# Patient Record
Sex: FEMALE | Race: WHITE
[De-identification: names, ages, dates, MRNs, and addresses within clinical notes are randomized per-mention and may not be internally consistent; named-entity substitution may affect disease eponyms.]

---

## 2018-01-17 ENCOUNTER — HOSPITAL ENCOUNTER (EMERGENCY)
Dept: HOSPITAL 44 - ED | Age: 73
Discharge: HOME | End: 2018-01-17
Payer: MEDICARE

## 2018-01-17 VITALS — DIASTOLIC BLOOD PRESSURE: 86 MMHG | SYSTOLIC BLOOD PRESSURE: 161 MMHG

## 2018-01-17 DIAGNOSIS — J02.9: Primary | ICD-10-CM

## 2018-01-17 DIAGNOSIS — F41.9: ICD-10-CM

## 2018-01-17 DIAGNOSIS — I10: ICD-10-CM

## 2018-01-17 LAB
BASOPHILS NFR BLD: 0.6 % (ref 0–1.5)
EGFR (AFRICAN): > 60
EGFR (NON-AFRICAN): > 60
EOSINOPHIL NFR BLD: 2.8 % (ref 0–6.8)
MCH RBC QN AUTO: 31.7 PG (ref 28–34)
MCV RBC AUTO: 91.2 FL (ref 80–100)
MONOCYTES %: 4.2 % (ref 0–11)
NEUTROPHILS #: 5.9 # K/UL (ref 1.4–7.7)

## 2018-01-17 PROCEDURE — 96360 HYDRATION IV INFUSION INIT: CPT

## 2018-01-17 PROCEDURE — 99283 EMERGENCY DEPT VISIT LOW MDM: CPT

## 2018-01-17 PROCEDURE — 80053 COMPREHEN METABOLIC PANEL: CPT

## 2018-01-17 PROCEDURE — 71020: CPT

## 2018-01-17 PROCEDURE — 85025 COMPLETE CBC W/AUTO DIFF WBC: CPT

## 2018-01-17 PROCEDURE — S1016 NON-PVC INTRAVENOUS ADMINIST: HCPCS

## 2018-01-17 PROCEDURE — 87400 INFLUENZA A/B EACH AG IA: CPT

## 2018-01-17 PROCEDURE — 84484 ASSAY OF TROPONIN QUANT: CPT

## 2018-01-17 NOTE — ED PHYSICIAN DOCUMENTATION
Upper Respiratory Symptoms





- HISTORIAN


Historian: patient





- HPI


Stated Complaint: SOA


Chief Complaint: Cough/ Upper Respiratory


Onset: hours


Severity: mild


Associated Symptoms: sore throat, hoarseness.  denies: fever, chills, sweating, 

earache, runny nose, sinus pain, sinus drainage, hay fever, shortness of breath


Worsened by Deep Breath: No


Further Comments: yes (72 year old female patient presents with complaints of 

being "hoarse" and anxious.  Patient reports symptoms started around [id number], 

did not use any OTC medication PTA.)





- ROS


CONST/EYES: denies: weakness


CVS/RESP: none


LYMPH: denies: leg swelling, rash, swollen glands, ankle swelling, other


GI/: none


NEURO/PSYCH: denies: fainting, dizziness, confusion, anxiety, depression, other


MS/SKIN: denies: joint pain, muscle aches, rash, other





- PAST HX


Lung Disease: none


PE Risk Factors: hypertension


Other History: other (chronic sinusitis)


Allergies/Adverse Reactions: 


 Allergies











Allergy/AdvReac Type Severity Reaction Status Date / Time


 


albuterol AdvReac  Tremors Verified 01/17/18 10:46














Home Medications: 


 Ambulatory Orders











 Medication  Instructions  Recorded


 


Fexofenadine HCl 180 mg PO DAILY #30 u2 03/18/13


 


Triamcinolone Acetonide [Nasacort] 10.8 ml NS DAILY 01/12/15


 


amLODIPine BESYLATE [Norvasc] 5 mg PO DAILY #30 tablet 01/17/18














- SOCIAL HX


Smoking History: non-smoker





- FAMILY HX


Family History: denies: none





- VITAL SIGNS


Vital Signs: 


 Vital Signs











Temp Pulse Resp BP Pulse Ox


 


 97.4 F L  84   19   161/86   98 


 


 01/17/18 09:53  01/17/18 12:20  01/17/18 12:20  01/17/18 12:20  01/17/18 12:20














- REVIEWED ASSESSMENTS


Nursing Assessment  Reviewed: Yes


Vitals Reviewed: Yes





Progress





- Progress


Progress: 





Discussed hypertension.  Patient reports she was on lisinopril which caused her 

to cough.  Dr Owens changed to losartan, but she stopped that over 1 year 

ago.  " I didn't like it."  Patient has not followed up with PCP regarding BP. 





Case discussed with Dr Owens.  Will start patient on norvasc 5mg po qd.  





Hypertension retreated with clonidine while in ER.  Improved with 1 dose. 





Education on hypertension, strongly encouraged follow up. Reviewed lab results; 

questions answered. 





ED Results Lab/Radiology





- Lab Results


Lab Results: 


 Lab Results











  01/17/18 01/17/18 01/17/18





  11:00 10:29 10:25


 


WBC      





    


 


RBC      





    


 


Hgb      





    


 


Hct      





    


 


MCV      





    


 


MCH      





    


 


MCHC      





    


 


RDW      





    


 


Plt Count      





    


 


Neut % (Auto)      





    


 


Lymph % (Auto)      





    


 


Mono % (Auto)      





    


 


Eos % (Auto)      





    


 


Baso % (Auto)      





    


 


Neut # (Auto)      





    


 


Lymph # (Auto)      





    


 


Mono # (Auto)      





    


 


Eos # (Auto)      





    


 


Baso # (Auto)      





    


 


Reactive Lymphs %      





    


 


Reactive Lymphs #      





    


 


Sodium      142 mmol/L mmol/L





     (136-145) 


 


Potassium      3.7 mmol/L mmol/L





     (3.5-5.1) 


 


Chloride      105 mmol/L mmol/L





     () 


 


Carbon Dioxide      25 mmol/L mmol/L





     (22-30) 


 


BUN      18 mg/dL H mg/dL





     (7-17) 


 


Creatinine      0.70 mg/dL mg/dL





     (0.52-1.04) 


 


Estimated Creat Clear      105 





    


 


Est GFR ( Amer)      > 60 





    (60 - )


 


Est GFR (Non-Af Amer)      > 60 





    (60 - )


 


Glucose      106 mg/dL mg/dL





     () 


 


Calcium      9.9 mg/dL mg/dL





     (8.4-10.2) 


 


Total Bilirubin      0.4 mg/dL mg/dL





     (0.2-1.3) 


 


AST      25 U/L U/L





     (15-46) 


 


ALT      38 U/L U/L





     (13-69) 


 


Alkaline Phosphatase      69 U/L U/L





     () 


 


Troponin I  < 0.03 ng/mL L ng/mL    





   (0.03-0.06)   


 


Total Protein      7.3 g/dL g/dL





     (6.3-8.2) 


 


Albumin      4.2 g/dL g/dL





     (3.5-5.0) 


 


Influenza A (Rapid)    Negative   





    (NEGATIVE)  


 


Influenza B (Rapid)    Negative   





    (NEGATIVE)  














  01/17/18





  10:25


 


WBC  8.80 K/ul K/ul





   (4.00-12.00) 


 


RBC  4.77 M/ul M/ul





   (3.90-5.20) 


 


Hgb  15.1 g/dL g/dL





   (12.0-16.0) 


 


Hct  43.5 % %





   (34.5-46.5) 


 


MCV  91.2 fl fl





   (80.0-100.0) 


 


MCH  31.7 pg pg





   (28.0-34.0) 


 


MCHC  34.8 g/dL g/dL





   (30.0-36.0) 


 


RDW  13.5 % %





   (11.3-14.3) 


 


Plt Count  218 K/mm3 K/mm3





   (130-400) 


 


Neut % (Auto)  67.9 % %





   (39.0-79.0) 


 


Lymph % (Auto)  23.0 % %





   (16.0-50.0) 


 


Mono % (Auto)  4.2 % %





   (0.0-11.0) 


 


Eos % (Auto)  2.8 % %





   (0.0-6.8) 


 


Baso % (Auto)  0.6 





   (0.0-1.5) 


 


Neut # (Auto)  5.9 # k/uL # k/uL





   (1.4-7.7) 


 


Lymph # (Auto)  2.0 # k/uL # k/uL





   (0.6-4.0) 


 


Mono # (Auto)  0.4 # k/uL # k/uL





   (0.0-0.9) 


 


Eos # (Auto)  0.2 # k/uL # k/uL





   (0.0-0.6) 


 


Baso # (Auto)  0.0 # k/uL # k/uL





   (0.0-0.5) 


 


Reactive Lymphs %  1.5 % %





   (0.0-5.0) 


 


Reactive Lymphs #  0.1 # k/uL # k/uL





   (0.0-0.8) 


 


Sodium  





  


 


Potassium  





  


 


Chloride  





  


 


Carbon Dioxide  





  


 


BUN  





  


 


Creatinine  





  


 


Estimated Creat Clear  





  


 


Est GFR ( Amer)  





  


 


Est GFR (Non-Af Amer)  





  


 


Glucose  





  


 


Calcium  





  


 


Total Bilirubin  





  


 


AST  





  


 


ALT  





  


 


Alkaline Phosphatase  





  


 


Troponin I  





  


 


Total Protein  





  


 


Albumin  





  


 


Influenza A (Rapid)  





  


 


Influenza B (Rapid)  





  














- Orders


Orders: 


 ED Orders











 Category Date Time Status


 


 Continuous EKG monitoring Q30M Care  01/17/18 10:01 Active


 


 Continuous Pulse Oximetry Q30M Care  01/17/18 10:01 Active


 


 Place IV Lock 1T Care  01/17/18 10:01 Active


 


 CHEST 2 VIEW [CHEST P.A.&LAT 2 VIEWS] [RAD] Stat Exams  01/17/18 Completed


 


 CBC/PLATELET/DIFF Stat Lab  01/17/18 10:25 Completed


 


 CMP Stat Lab  01/17/18 10:25 Completed


 


 INFLUENZA A&B Stat Lab  01/17/18 10:29 Completed


 


 TROPONIN I (cTnI) Stat Lab  01/17/18 11:00 Completed


 


 0.9 % Sodium Chloride [Normal Saline] 1,000 ml Med  01/17/18 11:00 Discontinued





 IV NOW   


 


 CloNIDine HCL [Catapress] Med  01/17/18 11:38 Discontinued





 0.1 mg PO .STK-MED ONE   


 


 CloNIDine HCL [Catapress] Med  01/17/18 11:37 Discontinued





 0.1 mg PO NOW ONE   


 


 EKG WITH COMPARISON Stat Ther  01/17/18 10:01 Ordered














Upper Respiratory Symptoms





- EXAM


General Appearance: no acute distress, alert


EENT: eyes nml inspection, nml ENT inspection, lids & conjunct. nml, PERRL, ear 

nml, nose nml, pharynx nml, airway nml, other (mildly hoareness noted. )


Respiratory: no resp. distress, breath sounds nml, no pain on inspiration, 

speaks full sentences, no pleuritic chest pain


Abdomen: non-tender, no organomegaly, nml bowel sounds, no distention


CVS: reg rate & rhythm, heart sounds normal, equal pulses, no murmur, no gallop

, PMI nml, no JVD, no friction rub, 24


Skin: color nml, no rash, warm,dry


Extremities: non-tender, normal range of motion, no evidence of injury, no edema

, J, NP


Neuro/Psych: oriented x3, neuro intact, mood/affect nml, CN's nml as tested





Discharge


Clincal Impression: 


 Laryngitis, Anxiety





Hypertension


Qualifiers:


 Hypertension type: essential hypertension Qualified Code(s): I10 - Essential (

primary) hypertension





Prescriptions: 


amLODIPine BESYLATE [Norvasc] 5 mg PO DAILY #30 tablet


Additional Instructions: 


 your prescription and start it in the morning. 





Chloraseptic spray or lozenges as needed for throat pain.  


Warm salt water gargles as needed pain 


Increase your fluid intake  juices, hot tea, non-caffeinated beverages





Follow up with Dr Owens next week for a BP check and to discuss anxiety 

management. 


Condition: Stable


Disposition: 01 HOME, SELF-CARE


Decision to Admit: NO


Decision Time: 12:10

## 2018-01-17 NOTE — DIAGNOSTIC IMAGING REPORT
TY ROLON (SAYDA) - ER 

[facility]

[address] [facility] [address] [address]

 

 

 

 

Report Submission Date: 2018 10:54:41 AM CST

Patient       Study

Name:   MESHA INGRAM       Date:   2018 10:43:19 AM CST

MRN:          Modality Type:   CR

Gender:   F       Description:   CHEST

:   47       Institution:   [facility]

Physician:   TY ROLON) - ER

     Accession:    [id number]

 

 

Examination: Portable chest 



History: Evaluate lungs 



Comparison exam: None provided 



Findings: Single view of the chest demonstrates a normal cardiac and 
mediastinal silhouette. Chronic appearing interstitial changes. Lung fields 
without focal infiltrate. No blunting of the costophrenic margins. Left midlung 
granuloma.   Osseous structures are appropriate for age. 



Impression: No acute appearing pulmonary process.

 

Electronically signed on 2018 10:54:41 AM CST by:

Liban BROOKS

## 2018-04-27 ENCOUNTER — HOSPITAL ENCOUNTER (EMERGENCY)
Dept: HOSPITAL 44 - ED | Age: 73
Discharge: HOME | End: 2018-04-27
Payer: MEDICARE

## 2018-04-27 VITALS — SYSTOLIC BLOOD PRESSURE: 139 MMHG | DIASTOLIC BLOOD PRESSURE: 80 MMHG

## 2018-04-27 DIAGNOSIS — J45.998: ICD-10-CM

## 2018-04-27 DIAGNOSIS — J44.1: Primary | ICD-10-CM

## 2018-04-27 PROCEDURE — 71046 X-RAY EXAM CHEST 2 VIEWS: CPT

## 2018-04-27 PROCEDURE — 96372 THER/PROPH/DIAG INJ SC/IM: CPT

## 2018-04-27 PROCEDURE — 99284 EMERGENCY DEPT VISIT MOD MDM: CPT

## 2018-04-27 PROCEDURE — 94640 AIRWAY INHALATION TREATMENT: CPT

## 2018-04-27 NOTE — DIAGNOSTIC IMAGING REPORT
MAGNUS HEDRICK 

[facility]

[address] [facility] [address] [address]

 

 

 

 

Report Submission Date: 2018 7:37:20 PM CDT

Patient       Study

Name:   MESHA INGRAM       Date:   2018 6:47:38 PM CDT

MRN:   [id number]       Modality Type:   DX

Gender:   F       Description:   CHEST

:   47       Institution:   [facility]

Physician:   MAGNUS HEDRICK

     Accession:    [id number]

 

 

HISTORY:   72-year-old female with wheezing, swelling and ankles, history of 
asthma. 



COMPARISON: None available. 



TECHNIQUE: 2 views of the chest were performed. 



FINDINGS: There are calcified granulomas in the left lung.  No pneumothorax, 
consolidative infiltrates, pleural effusions, or pulmonary edema.  There may be 
calcified lymph nodes in the left hilum.  The heart is borderline enlarged.  
There is mild to moderate thoracic degenerative disc disease. 



IMPRESSION: 

Old granulomatous disease of the chest without evidence of acute intrathoracic 
process.

 

Electronically signed on 2018 7:37:20 PM CDT by:

Raymundo BROOKS

## 2018-04-27 NOTE — ED PHYSICIAN DOCUMENTATION
General Adult





- HISTORIAN


Historian: patient





- HPI


Stated Complaint: wheezing


Chief Complaint: General Adult


Additional Information: 





Three days of increased wheezing in known asthmatic. Ha snot needed to use her 

nebulizer in years, and it no longer works. Allergic to albuterol (heart rate 

to 220) but can use levalbuterol. Seen at walk in clinic yesterday and started 

prednisone and zithromax. No better today. Had temp of 100 yesterday. Two 

hospitalizations for asthma but never intubated. No other modifying factors or 

associated signs.. 





- ROS


CONST: fever (see above)





- PAST HX


Past History: asthma





- SOCIAL HX


Smoking History: non-smoker





- FAMILY HX


Family History: No (no significant)





- VITAL SIGNS


Vital Signs: 





 Vital Signs











Temp Pulse Resp BP Pulse Ox


 


          161/86    


 


          01/17/18 12:20   














- REVIEWED ASSESSMENTS


Nursing Assessment  Reviewed: Yes


Vitals Reviewed: Yes





<MAGNUS HEDRICK - Last Filed: 04/27/18 19:12>





- VITAL SIGNS


Vital Signs: 





 Vital Signs











Temp Pulse Resp BP Pulse Ox


 


 98.2 F   130 H  22   178/135   90 L


 


 04/27/18 18:27  04/27/18 18:27  04/27/18 18:27  04/27/18 18:27  04/27/18 18:27














<Shahid Araujo - Last Filed: 04/27/18 22:55>





- PAST HX


Allergies/Adverse Reactions: 


 Allergies











Allergy/AdvReac Type Severity Reaction Status Date / Time


 


albuterol AdvReac  Tremors Verified 01/17/18 10:46


 


iodine AdvReac  Anaphylaxis Verified 04/27/18 19:27


 


levofloxacin [From Levaquin] AdvReac  Anaphylaxis Verified 04/27/18 19:27


 


metronidazole AdvReac  Anaphylaxis Verified 04/27/18 19:27














Home Medications: 


 Ambulatory Orders











 Medication  Instructions  Recorded


 


Fexofenadine HCl 180 mg PO DAILY #30 u2 03/18/13


 


Triamcinolone Acetonide [Nasacort] 10.8 ml NS DAILY 01/12/15














Progress





- Progress


Progress: 





care to Dr. Araujo








<MAGNUS HEDRICK - Last Filed: 04/27/18 19:12>





ED Results Lab/Radiology





- Orders


Orders: 





 ED Orders











 Category Date Time Status


 


 CHEST 2VIEW [RAD] Stat Exams  04/27/18 Ordered


 


 Levalbuterol HCl [Xopenex] Med  04/27/18 18:41 Once





 1.25 mg NEB NOW ONE   














<MAGNUS HEDRICK - Last Filed: 04/27/18 19:12>





- Orders


Orders: 





 ED Orders











 Category Date Time Status


 


 CHEST 2VIEW [RAD] Stat Exams  04/27/18 Completed


 


 Acetylcysteine [Mucomyst] Med  04/27/18 20:34 Discontinued





 200 mg PO NOW ONE   


 


 Acetylcysteine [Mucomyst] Med  04/27/18 22:41 Discontinued





 200 mg PO NOW ONE   


 


 Levalbuterol HCl [Xopenex] Med  04/27/18 18:41 Discontinued





 1.25 mg NEB NOW ONE   


 


 Levalbuterol HCl [Xopenex] Med  04/27/18 19:42 Discontinued





 1.25 mg NEB NOW ONE   


 


 methylPREDNISolone SOD SUCC [Solu-MEDROL] Med  04/27/18 19:43 Discontinued





 125 mg IM NOW ONE   














<Shahid Araujo - Last Filed: 04/27/18 22:55>





General Adult Physical Exam





- PHYSICAL EXAM


GENERAL APPEARANCE: mild distress


EENT: eye inspection normal, ENT inspection normal, pharynx normal


NECK: supple


RESPIRATORY: no resp distress, wheezes (throughout)


CVS: reg rate & rhythm, heart sounds normal


BACK: other (thoracic kyphosis; fluid movements w/o pain)


SKIN: warm/dry, normal color


EXTREMITIES: no evidence of injury


NEURO: CN's nml as tested, motor nml, sensation nml, cognition normal





<MAGNUS HEDRICK - Last Filed: 04/27/18 19:12>





- PHYSICAL EXAM


ABDOMEN: soft, no organomegaly, no distension





<Shahid Araujo - Last Filed: 04/27/18 22:55>





Discharge





<MAGNUS HEDRICK - Last Filed: 04/27/18 19:12>


Comments: 





home use neb cpt-fu w/pcp as directed


Decision to Admit: NO


Decision Time: 22:55





<Shahid Araujo Last Filed: 04/27/18 22:55>


Clincal Impression: 


 acute exaberation copd asthma





Referrals: 


Анна Owens MD [Primary Care Provider] - 2 Days


Condition: Good


Disposition: 01 HOME, SELF-CARE

## 2018-04-29 ENCOUNTER — HOSPITAL ENCOUNTER (EMERGENCY)
Dept: HOSPITAL 44 - ED | Age: 73
Discharge: HOME | End: 2018-04-29
Payer: MEDICARE

## 2018-04-29 VITALS
DIASTOLIC BLOOD PRESSURE: 80 MMHG | DIASTOLIC BLOOD PRESSURE: 80 MMHG | SYSTOLIC BLOOD PRESSURE: 156 MMHG | SYSTOLIC BLOOD PRESSURE: 156 MMHG | DIASTOLIC BLOOD PRESSURE: 80 MMHG | DIASTOLIC BLOOD PRESSURE: 80 MMHG | SYSTOLIC BLOOD PRESSURE: 156 MMHG | SYSTOLIC BLOOD PRESSURE: 156 MMHG

## 2018-04-29 DIAGNOSIS — I10: ICD-10-CM

## 2018-04-29 DIAGNOSIS — J45.998: Primary | ICD-10-CM

## 2018-04-29 LAB
BASOPHILS NFR BLD: 0.2 % (ref 0–1.5)
EGFR (AFRICAN): > 60
EGFR (NON-AFRICAN): > 60
EOSINOPHIL NFR BLD: 0 % (ref 0–6.8)
MCH RBC QN AUTO: 30.7 PG (ref 28–34)
MCV RBC AUTO: 92.9 FL (ref 80–100)
MONOCYTES %: 4 % (ref 0–11)
NEUTROPHILS #: 11.9 # K/UL (ref 1.4–7.7)

## 2018-04-29 PROCEDURE — 84484 ASSAY OF TROPONIN QUANT: CPT

## 2018-04-29 PROCEDURE — 94640 AIRWAY INHALATION TREATMENT: CPT

## 2018-04-29 PROCEDURE — 83880 ASSAY OF NATRIURETIC PEPTIDE: CPT

## 2018-04-29 PROCEDURE — S1016 NON-PVC INTRAVENOUS ADMINIST: HCPCS

## 2018-04-29 PROCEDURE — 85730 THROMBOPLASTIN TIME PARTIAL: CPT

## 2018-04-29 PROCEDURE — 85025 COMPLETE CBC W/AUTO DIFF WBC: CPT

## 2018-04-29 PROCEDURE — 99283 EMERGENCY DEPT VISIT LOW MDM: CPT

## 2018-04-29 PROCEDURE — 85610 PROTHROMBIN TIME: CPT

## 2018-04-29 PROCEDURE — 82553 CREATINE MB FRACTION: CPT

## 2018-04-29 PROCEDURE — 96365 THER/PROPH/DIAG IV INF INIT: CPT

## 2018-04-29 PROCEDURE — 93005 ELECTROCARDIOGRAM TRACING: CPT

## 2018-04-29 PROCEDURE — 80053 COMPREHEN METABOLIC PANEL: CPT

## 2018-04-29 PROCEDURE — 82550 ASSAY OF CK (CPK): CPT

## 2018-04-29 PROCEDURE — 71045 X-RAY EXAM CHEST 1 VIEW: CPT

## 2018-04-29 PROCEDURE — 85379 FIBRIN DEGRADATION QUANT: CPT

## 2018-04-29 PROCEDURE — 83690 ASSAY OF LIPASE: CPT

## 2018-04-29 NOTE — ED PHYSICIAN DOCUMENTATION
General Adult





- HISTORIAN


Historian: patient, spouse





- HPI


Stated Complaint: sob


Chief Complaint: General Adult


Onset: days ago


Timing: still present


Severity: moderate


Further Comments: yes (Pt is a 71 yo female with sob/wheezing and slight pain, 1

/10 severity, in her R upper back.  Pt has hx asthma with 2 hospitalizations, 

but these occurred years ago and pt has not had asthma attacks until this past 

week.  Pt was seen in clinic last week and rx'd steroids and zithromax.  She 

came to ER on 2 days ago and was tx'd with xopenex and mucomyst.  Pt has had 

adverse reactions to albuterol and reported that mucomyst had been given to her 

in the past to good effect.  Pt came to ER because her home nebulizer is old 

and no longer works.  She was given a rx for a new nebulizer 2 days ago, but 

has not got the nebulizer yet.)





- ROS


CONST: no problems


EYES/ENT: none


CVS/RESP: chest pain (slight upper back pain), shortness of breath


GI/: none


MS/SKIN/LYMPH: none





- PAST HX


Past History: asthma, hypertension, other (anxiety)


Surgeries/Procedures: other (oral surgery)


Allergies/Adverse Reactions: 


 Allergies











Allergy/AdvReac Type Severity Reaction Status Date / Time


 


albuterol AdvReac  Tremors Verified 04/29/18 08:05


 


iodine AdvReac  Anaphylaxis Verified 04/29/18 08:05


 


levofloxacin [From Levaquin] AdvReac  Anaphylaxis Verified 04/29/18 08:05


 


metronidazole AdvReac  Anaphylaxis Verified 04/29/18 08:05














Home Medications: 


 Ambulatory Orders











 Medication  Instructions  Recorded


 


Fexofenadine HCl 180 mg PO DAILY #30 u2 03/18/13


 


Triamcinolone Acetonide [Nasacort] 10.8 ml NS DAILY 01/12/15














- SOCIAL HX


Smoking History: non-smoker





- FAMILY HX


Family History: No





- VITAL SIGNS


Vital Signs: 





 Vital Signs











Temp Pulse Resp BP Pulse Ox


 


          139/80    


 


          04/27/18 23:29   














- REVIEWED ASSESSMENTS


Nursing Assessment  Reviewed: Yes


Vitals Reviewed: Yes





Progress





- Progress


Progress: 





CXR: 1. Possible minimal basilar atelectasis.. No focal consolidation, pleural 

effusion or pneumothorax.


 





Pulmicort HFN





Xopenex HFN





Mucomyst 20% solution 3 ml HFN x 1





improved.








Rx Xopenex (1.25mg/3ml).  One HFN tx tid prn.  24 vials, 2 RF





- EKG/XRAY/CT


EKG: NSR (HR=98; normal axis; normal WA interval.)





General Adult Physical Exam





- PHYSICAL EXAM


GENERAL APPEARANCE: mild distress


EENT: pharynx normal


NECK: normal inspection, supple


RESPIRATORY: chest non-tender, wheezes


CVS: reg rate & rhythm, heart sounds normal, no murmur


ABDOMEN: soft, no organomegaly, normal bowel sounds


BACK: normal inspection, no CVA tenderness


SKIN: warm/dry, normal color


EXTREMITIES: non-tender, normal range of motion, no evidence of injury, no edema


NEURO: oriented X3, motor nml, sensation nml





Discharge


Clincal Impression: 


 wheezing





Referrals: 


Анна Owens MD [Primary Care Provider] - 


Condition: Stable


Disposition: 01 HOME, SELF-CARE


Decision to Admit: NO


Decision Time: 11:33

## 2018-04-29 NOTE — DIAGNOSTIC IMAGING REPORT
[facility]

[address] [facility] [address]

 

 

 

 

Report Submission Date: 2018 8:51:17 AM CDT

Patient       Study

Name:   MESHA INGRAM       Date:   2018 8:04:50 AM CDT

MRN:   [id number]       Modality Type:   DX

Gender:   F       Description:   CHEST

:   47       Institution:   [facility]

Physician:   RAJ CERNA

     Accession:    [id number]

 

 

Single frontal view of the chest 

History: PT STATES CHEST PAIN,SOB,PRODUCTIVE COUGH X4 DAYS 

Comparison: 2018 



Cardiac size is upper limits of normal. 7 mm left midlung calcified granuloma 
is again noted. 

Minimal basilar haziness.. No pleural effusion or pneumothorax. No acute 
osseous pathology. 



Impression: 

1. Possible minimal basilar atelectasis.. No focal consolidation, pleural 
effusion or pneumothorax.

 

Electronically signed on 2018 8:51:17 AM CDT by:

Rachna BROOKS

## 2018-05-11 ENCOUNTER — HOSPITAL ENCOUNTER (OUTPATIENT)
Dept: HOSPITAL 44 - PULMONARY | Age: 73
End: 2018-05-11
Attending: INTERNAL MEDICINE
Payer: MEDICARE

## 2018-05-11 DIAGNOSIS — J45.909: Primary | ICD-10-CM

## 2018-05-11 DIAGNOSIS — G47.33: ICD-10-CM

## 2018-05-11 DIAGNOSIS — R09.81: ICD-10-CM

## 2018-05-11 DIAGNOSIS — I10: ICD-10-CM

## 2018-05-11 DIAGNOSIS — J30.9: ICD-10-CM

## 2018-05-11 PROCEDURE — 99214 OFFICE O/P EST MOD 30 MIN: CPT

## 2018-05-25 NOTE — CONSULTATION REPORT
PRIMARY CARE PROVIDER:

Dr. Анна Owens



CONSULTING PHYSICIAN: 

Sarah Bowen MD



CHIEF COMPLAINT:

"I would like some help managing my asthma."



SIGNIFICANT PROBLEM LIST:

1.   Asthma diagnosed in 1977.

2.   Hypertension.

3.   Obstructive sleep apnea (ROASLINA).

4.   History of pneumonia. 

5.   History of DVT.

6.   "White count hypertension."

7.   Multiple environmental allergies. 



HISTORY OF PRESENT ILLNESS: 

This is a 72-year-old female who has had a long history of asthma and would 
like some input with her medications and how to control her asthma.  



Overall, her asthma is usually very well controlled, she is usually off of 
prednisone.  She is unable to take albuterol or salmeterol but can take 
levalbuterol (Xopenex).   



Patient states she developed a viral URI on April 26, 2018.  She saw a nurse 
practitioner for cough and sinus congestion.  She was prescribed Benzonatate, a 
prednisone taper, and azithromycin.  Her shortness of breath worsened and she 
presented to [facility] Emergency Department on April 27.
   A chest x-ray was done and she required 3 nebulized doses of Xopenex, 
Mucomyst, and was given methylprednisolone 125 mg.  She was also given 
Pulmicort (budesonide).   On April 30, patient saw Dr. Owens who put her 
on a prednisone taper and azithromycin.   She currently states she is feeling 
better and she is finishing her prednisone taper.   



Patient brought all of her respiratory and sinus medicines with her.  It was 
all on the bedside table and we reviewed all the medicines together.   One 
thing that became apparent is that she was taking suboptimal doses of 
fluticasone and using theophylline on a p.r.n. basis.  



She states that she does have problems with sinus congestion and that her sinus 
problems can exacerbate her asthma.  Currently, she is using a steroid nasal 
spray but only on a p.r.n. basis.  She is also using Allegra and Sudafed.  



She states that these are her environmental allergies, pine, (both inhaled and 
contact), cedar,   cottonwood, oak pollen, smoke, mold, mildew, scented candles
, except lavender, ingested bell peppers, and Cool Whip.  



Patient denies chest pain.  There is no PND.  There is no edema.  Her weight is 
stable.  Appetite is good.   Energy level is good.  Currently, there is a cough
, nonproductive, and it is improving.  She denies fever, chills, or sweats.   
There has been no hemoptysis.  She has been on prednisone and antibiotics for 
her lung disease. 



Tobacco use:   Life-long nonsmoker.



REVIEW OF SYSTEMS:

Please see HPI for pertinent review of systems.  Also, please see [facility] patient intake record.  



ALLERGIES:

1.    Metronidazole.

2.    Albuterol.

3.    Iodine. 

4.    Levofloxacin.



MEDICATIONS: 

1.   Xopenex 1 puff b.i.d. and p.r.n.

2.   Flovent 220 mcg 1 puff daily and p.r.n.

3.   Theophylline 300 mg p.r.n. shortness of breath.

4.   Zafirlukast 20 mg b.i.d.

5.   Allegra 180 mg daily.

6.   Guaifenesin p.r.n. for cough.

7.   Sudafed p.r.n. for sinus drainage.

8.   Triamcinolone nasal spray 1 spray p.r.n.

9.   Prednisone taper that will finished within several days.

10. Amlodipine 5 mg p.r.n. prior to doctor visits.

11. Umeclidinium 1 inhalation daily given as a sample.

12. Multiple supplements including Gelatin, Beet root, D-mannose,  Qunol. 



SOCIAL HISTORY: 

She lives with her .  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 
health and the medications that she is taking.  



FAMILY HISTORY: 

Father with heart disease.   



PHYSICAL EXAMINATION: 

VITAL SIGNS:   BP:  168/120 (Please note, blood pressure re-taken after the 
appointment was 147/98), P: 112, R: 20, T: 96.8.  Room air oxygen saturation 
was 95%.  Height:  5 feet 2 inches.  Weight:  165 pounds.  BMI of 30.2.  Class 
1 obesity.

GENERAL:   This is a well-developed, well-nourished female in no acute distress 
speaking in full sentences.  

HEENT:  Pupils are equal and reactive to light.  Oropharynx is clear.  No 
thrush.  No erythema.

NECK:  Supple.  No JVD.  No lymphadenopathy.  No thyromegaly.

LUNGS:   Good bilateral air excursion.   No wheezes, crackles, or rhonchi.

CARDIAC:   Rate and rhythm are regular.  S1 and S2, without S3 or S4 or murmur.
   No gallops or rubs.

ABDOMEN:   Obese.  Soft.  Positive bowel sounds.  Nontender.    No organomegaly.

EXTREMITIES:   No cyanosis, clubbing, or edema.  

NEUROLOGIC:   Alert and oriented x3.   Grossly nonfocal exam.



IMAGING: 

Imaging independently reviewed by me personally.



1.    Chest x-ray on April 29, 2018.   Portable film.  Left upper lobe 
granuloma.  Bi-basilar atelectasis.  

2.    Chest x-ray on April 27, 2018.   Left upper lobe granuloma.  
Peribronchial cuffing.  No infiltrate. 



LABORATORIES:

1.    Chemistry done on April 29, 2018.   Sodium 144, potassium 4, chloride 104
, bicarbonate 23, BUN 21, creatinine 0.7, glucose 176. 

2.    CBC done on April 29, 2018.   Hemoglobin 16, hematocrit 48, white blood 
cell count 14.4, platelets 255,000.  



ASSESSMENT AND PLAN:

PROBLEM  #1.    Asthma. 

With the patient, we have reviewed all of her medicines and how she is taking 
them.  



PLAN:

1.   Increase the Flovent 220 mcg to 2 puffs b.i.d. regularly.  This is not a 
p.r.n. medication. 

2.   Addition of tiotropium 18 mcg capsule, 1 inhalation daily.

3.   I have reviewed the medical letter treatment of asthma with the patient 
and I have given her a copy of this.  



PROBLEM  #2:    Sinus congestion.  



PLAN: 

1.   Two sprays of nasal triamcinolone daily.   Again, this is not a p.r.n. 
medication.  

2.   Patient to return in 6 weeks.



PROBLEM #3:   Obstructive sleep apnea.

Due to time constraints, we did not discuss this issue.  



PLAN: 

Discuss at next visit. 





cc:   Dr. Анна BROOKS

## 2018-07-27 ENCOUNTER — HOSPITAL ENCOUNTER (OUTPATIENT)
Dept: HOSPITAL 44 - PULMONARY | Age: 73
End: 2018-07-27
Attending: INTERNAL MEDICINE
Payer: MEDICARE

## 2018-07-27 DIAGNOSIS — J45.909: Primary | ICD-10-CM

## 2018-07-27 DIAGNOSIS — R09.81: ICD-10-CM

## 2018-07-27 DIAGNOSIS — G47.33: ICD-10-CM

## 2018-07-27 PROCEDURE — 99214 OFFICE O/P EST MOD 30 MIN: CPT

## 2018-08-03 NOTE — OP CLINIC PROGRESS NOTE
PRIMARY CARE PROVIDER:

Dr. Анна Owens



CHIEF COMPLAINT:

"My asthma is better."



SIGNIFICANT PROBLEM LIST:

1.   Asthma diagnosed in 1977.

2.   Hypertension. 

3.   Obstructive sleep apnea. 

4.   History of pneumonia. 

5.   History of deep venous thrombosis (DVT).

6.   "White coat hypertension."

7.   Multiple environmental allergies



HISTORY OF PRESENT ILLNESS: 

This is a 72-year-old female who returns to the Pulmonary Clinic to assess her 
asthma.  She states that she feels great.  She has taken the previous 
recommendation and has increased her Flovent to 2 puffs b.i.d. and she has 
increased her nasal triamcinolone to 2 sprays each nostril daily.  She states 
that she has not had to use any Xopenex p.r.n. and is no longer using 
theophylline.   



I had given her a prescription for tiotropium at our last visit.  Her pharmacy 
was unable to fill that due to her insurance and she was given Incruse Ellipta (
umeclidinium) instead.  She states she used it for a short while but, overall, 
she felt that she did not need it.   She states that she will keep it and use 
it if she gets more short of breath. 



ALLERGIES:

1.   Metronidazole

2.   Albuterol.

3.   Iodine. 

4.   Levofloxacin. 



MEDICATIONS: 

1.   Xopenex 1 puff p.r.n. 

2.   Flovent 220 mcg 2 puffs b.i.d.

3.   Theophylline 300 mg p.r.n. shortness of breath.

4.   Zafirlukast 20 mg b.i.d.

5.   Allegra 180 mg daily. 

6.   Guaifenesin syrup p.r.n. 

7.   Sudafed p.r.n. for sinus drainage.

8.   Triamcinolone nasal spray 1 spray b.i.d.

9.   D-mannose 1 capsule daily. 

10. Qunol 1 capsule daily.

11. Multivitamin 1 tablet daily. 

12. Alaway eye drops.

13. Opcon eye drops.

14. Umeclidinium 1 inhalation p.r.n.

15. Gelatin 1300 mg daily. 

16. Beet root  1000 mg daily. 

17. Clonidine 0.1 mg prior to doctor's appointments p.r.n.



PHYSICAL EXAMINATION: 

VITAL SIGNS:  BP:  140/97, P: 90, R: 20, T: 98.5, room air oxygen saturation 
was 97%.  Weight:  164 pounds (Increase by 1 pound since 5-11-18). 

GENERAL:   This is a well-developed, well-nourished female in no acute distress 
speaking in full sentences.  

HEENT:  Pupils are equal and reactive to light.  Oropharynx is clear.  No 
thrush.  No erythema.

NECK:  Supple.  No lymphadenopathy.  

LUNGS:   Good bilateral air excursion.   Clear.  No wheezes, rhonchi or rales. 

CARDIAC:   Rate and rhythm are regular.  No murmur, rubs, or gallops.  

ABDOMEN:   Obese.  Soft and nontender.   

EXTREMITIES:   No edema, cyanosis, or clubbing.  

NEUROLOGIC:  Alert and oriented x3.   Grossly nonfocal physical exam.



IMAGING:

All imaging was independently reviewed by me personally.



1.   Chest x-ray on April 27, 2018.   Left granuloma.  Right lung atelectasis.  

2.   Chest x-ray on April 29, 2018.   Portable film.   Left upper lobe 
granuloma.



ASSESSMENT AND PLAN: 

PROBLEM  #1:   Asthma.

Patient's asthma currently under very good control.  She has incorporated my 
suggestions from our last visit and now using the appropriate doses of Flovent 
and nasal triamcinolone.  



PLAN: 

1.   Continue Flovent 220 mcg 2 puffs b.i.d.

2.   Use umeclidinium on a p.r.n. basis for significant asthma exacerbations (
please see Medical Letter for reference).

3.   Continue Xopenex p.r.n. as a rescue inhaler. 



PROBLEM  #2:   Sinus congestion.

Again, this is much improved since the patient started using triamcinolone at 
the proper dose and frequency.



PLAN: 

Continue nasal triamcinolone 2 sprays each nostril daily. 



PROBLEM  #3:   Obstructive sleep apnea. 

Again, due to time constraints, we did not discuss this issue.  



PLAN: 

1.   Does need to be discussed at subsequent visit. 

2.   Follow up in November 2018.





cc:   Dr. Анна BROOKS

## 2018-11-09 ENCOUNTER — HOSPITAL ENCOUNTER (OUTPATIENT)
Dept: HOSPITAL 44 - PULMONARY | Age: 73
End: 2018-11-09
Attending: INTERNAL MEDICINE
Payer: MEDICARE

## 2018-11-09 DIAGNOSIS — Z53.8: Primary | ICD-10-CM

## 2018-11-09 PROCEDURE — 99212 OFFICE O/P EST SF 10 MIN: CPT

## 2018-11-20 NOTE — OP CLINIC PROGRESS NOTE
PRIMARY CARE PROVIDER:

Dr. Анна Owens



CHIEF COMPLAINT:

"I had pneumonia in October."



SIGNIFICANT PROBLEM LIST:

1.   Asthma diagnosed in 1977.

2.   Hypertension. 

3.   Obstructive sleep apnea. 

4.   History of pneumonia.

5.   History of DVTs.

6.   "White coat hypertension."

7.   Multiple environmental allergies.



HISTORY OF PRESENT ILLNESS: 

This is a 73-year-old female who was in [place], [place], visiting her son.  She
developed a cough, fever, and sputum production.  She went to the emergency 
department and was told she had left lower lobe pneumonia.  She was treated with
ceftriaxone and azithromycin in the 

emergency department.  She was not admitted, and she was sent home with 
Augmentin and prednisone.  



She states she feels fine now excepts she notes she still has a hoarse voice.  
The hospital in [place] suggested she use a spacer which she is currently using.
 



She continues to use the fluticasone 220 mcg 2 puffs b.i.d.  She uses her 
umeclidinium on a p.r.n. basis.  She has stopped using theophylline and she also
is using her triamcinolone nasal spray. 



ALLERGIES:

1.   Metronidazole.

2.   Albuterol.

3.   Iodine.

4.   Levofloxacin. 



MEDICATIONS: 

1.   Xopenex 1 puff p.r.n. shortness of breath.

2.   Fluticasone 220 mcg 2 puffs b.i.d.

3.   Zafirlukast 20 mg b.i.d.

4.   Allegra 180 mg daily. 

5.   Guaifenesin p.r.n.

6.   Triamcinolone nasal spray 1 spray each nostril b.i.d.

7.   D-Mannose 1 capsule daily.

8.   Qunol 1 capsule daily. 

9.   MVI 1 tablet daily. 

10. Alaway  eye drops. 

11. Opcon eye drops. 

12. Umeclidinium 1 inhalation p.r.n. 

13. Gelatin 1300 mg daily.

14. Beet root 1000 mg daily. 

15. Clonidine 0.1 mg prior to doctor's appointments p.r.n. 

16. Theophylline 300 mg p.r.n. (Not taking at present). 



PHYSICAL EXAMINATION: 

GENERAL:   This is a well-developed obese female in no acute distress speaking 
in full sentences.  

VITAL SIGNS:  BP:  148/87, P: 87, R: 20, T: 96.7, oxygen saturation is 95% on 
room air.  Weight:  162 pounds.  Height:  5 feet 2 inches. 

HEENT:  Pupils are equal and reactive to light.  Oropharynx is clear.  No 
thrush.  No erythema.

NECK:  Supple.  No lymphadenopathy.  

LUNGS:   Good bilateral air excursion.   Clear to auscultation.  No wheezes, 
rales, or rhonchi.

CARDIAC:   Rate and rhythm are regular.  No murmur, rubs, or gallops.  

ABDOMEN:   Obese.  Soft and nontender.   

EXTREMITIES:   No cyanosis, clubbing, or edema. 

NEUROLOGIC:  Alert and oriented x3.   Grossly nonfocal exam.   



IMAGING:

Unable to review imaging because the PAC system does not work. 



ASSESSMENT:

PROBLEM  #1:   Asthma.

Currently doing well and is stable.  Of note, she did have pneumonia treated 
appropriately with Augmentin and prednisone in October 2018.  



PLAN: 

1.   Continue fluticasone 220 mcg 2 puffs b.i.d.

2.   Use umeclidinium on a p.r.n. basis for significant asthma exacerbation 
(Please see medical letter for reference).

3.   Patient has asked me to rewrite her Xopenex prescription using the word 
levalbuterol, which is the generic for Xopenex, due to insurance procedure.  
Prescription written for levalbuterol 2 puffs every 2 hours p.r.n. shortness of 
breath or wheezing.



PROBLEM  #2:   Sinus congestion. 

Patient has been using her triamcinolone nasal spray routinely and her sinuses 
are currently not congested.  



PLAN:  

1.    Continue nasal triamcinolone 2 sprays each nostril daily. 

2.    Ipratropium nasal spray 0.6% 2 sprays each nostril b.i.d. to t.i.d. p.r.n.
nasal discharge.



PROBLEM  #3:   My termination.

I have instructed the patient that this will be my last day at [facility] and I have referred her to Dr. Marco Gao if she would 
like to continue with a pulmonologist.  





cc:  Dr. Анна BROOKS

## 2019-01-07 ENCOUNTER — HOSPITAL ENCOUNTER (EMERGENCY)
Dept: HOSPITAL 44 - ED | Age: 74
Discharge: ANOTHER HOSPITAL | End: 2019-01-07
Payer: MEDICARE

## 2019-01-07 DIAGNOSIS — J18.9: ICD-10-CM

## 2019-01-07 DIAGNOSIS — R09.02: ICD-10-CM

## 2019-01-07 DIAGNOSIS — R06.03: ICD-10-CM

## 2019-01-07 DIAGNOSIS — J45.901: Primary | ICD-10-CM

## 2019-01-07 LAB
BASOPHILS NFR BLD: 0.7 % (ref 0–1.5)
EGFR (NON-AFRICAN): > 60
EOSINOPHIL NFR BLD: 1.4 % (ref 0–6.8)
MCH RBC QN AUTO: 31.4 PG (ref 28–34)
MCV RBC AUTO: 95 FL (ref 80–100)
MONOCYTES %: 5.8 % (ref 0–11)
NEUTROPHILS #: 15.8 # K/UL (ref 1.4–7.7)

## 2019-01-07 PROCEDURE — 96365 THER/PROPH/DIAG IV INF INIT: CPT

## 2019-01-07 PROCEDURE — 94640 AIRWAY INHALATION TREATMENT: CPT

## 2019-01-07 PROCEDURE — 96375 TX/PRO/DX INJ NEW DRUG ADDON: CPT

## 2019-01-07 PROCEDURE — 36415 COLL VENOUS BLD VENIPUNCTURE: CPT

## 2019-01-07 PROCEDURE — 87086 URINE CULTURE/COLONY COUNT: CPT

## 2019-01-07 PROCEDURE — 83605 ASSAY OF LACTIC ACID: CPT

## 2019-01-07 PROCEDURE — 87040 BLOOD CULTURE FOR BACTERIA: CPT

## 2019-01-07 PROCEDURE — 81002 URINALYSIS NONAUTO W/O SCOPE: CPT

## 2019-01-07 PROCEDURE — 93005 ELECTROCARDIOGRAM TRACING: CPT

## 2019-01-07 PROCEDURE — 99285 EMERGENCY DEPT VISIT HI MDM: CPT

## 2019-01-07 PROCEDURE — 96367 TX/PROPH/DG ADDL SEQ IV INF: CPT

## 2019-01-07 PROCEDURE — 85025 COMPLETE CBC W/AUTO DIFF WBC: CPT

## 2019-01-07 PROCEDURE — S1016 NON-PVC INTRAVENOUS ADMINIST: HCPCS

## 2019-01-07 PROCEDURE — 71045 X-RAY EXAM CHEST 1 VIEW: CPT

## 2019-01-07 PROCEDURE — 80053 COMPREHEN METABOLIC PANEL: CPT

## 2019-01-07 NOTE — ED PHYSICIAN DOCUMENTATION
Asthma





- HISTORIAN


Historian: patient





- HPI


Chief Complaint: Asthma


Additional Information: 





intro self as NP. pt presents to the ED via POV c/o wheezing/asthma hx that 

worsened at 5 pm today. pt reports URI symptoms since friday. pt has tried home 

nebulizers without improvement. pt emesis x 1 here. 





pt reports she is allergic to albuterol-increased heart rate 220s. not effective

per pt. 





pt denies current chest pain, syncope/near syncope, headache, dizziness, visual 

disturbances, diarrhea. fever/chills, rash, sick contacts, dysuria, trauma. 

melena or hematochezia, bleeding or easy bruising, change in bowel or bladder 

function, no recent weight loss/gain, anxiety or depression. ROS Negative unless

otherwise specified. 


Duration: continues in ED


Initiating Event: upper respiratory illness


Associated Symptoms:: shortness of breath


Current Asthma Therapy: inhaled nebulizer, other (incruse)





- ROS


CONST: recent illness


EYES/ENT: runny nose.  denies: sore throat


CVS: denies: heart racing, palpitations


GI/: denies: abdominal pain, problems urinating, vomiting, nausea


MS/SKIN/LYMPH: denies: ankle swelling, leg pain, rash, swollen glands, leg 

swelling, calf pain


NEURO/PSYCH: light-headedness.  denies: headache, dizziness, anxiety, tingling 

hands, muscle spasms hands, tingling face, muscle spasms face





- PAST HX


Asthma: occasional attacks


Lung Disease: asthma


DVT/PE Risk Factors: none


Allergies/Adverse Reactions: 


                                    Allergies











Allergy/AdvReac Type Severity Reaction Status Date / Time


 


Metronidazole HCl Allergy Unknown  Verified 19 20:44


 


albuterol AdvReac  Tremors Verified 19 20:44


 


iodine AdvReac  Anaphylaxis Verified 19 20:44


 


levofloxacin [From Levaquin] AdvReac  Anaphylaxis Verified 19 20:44


 


metronidazole AdvReac  Anaphylaxis Verified 19 20:44











Home Medications: 


                                Ambulatory Orders











 Medication  Instructions  Recorded


 


Fexofenadine HCl 180 mg PO DAILY #30 u2 13


 


Triamcinolone Acetonide [Nasacort] 10.8 ml NS DAILY 01/12/15














- SOCIAL HX


Smoking History: non-smoker


Alcohol Use: none


Drug Use: none





- FAMILY HX


Family History: CAD





- VITAL SIGNS


Vital Signs: 





                                   Vital Signs











Temp Pulse Resp BP Pulse Ox


 


          156/80    


 


          18 11:19   














- REVIEWED ASSESSMENTS


Nursing Assessment  Reviewed: Yes


Vitals Reviewed: Yes





Progress





- Progress


Progress: 





 pt remains dyspneic after xopenex #1. 88% on 2 L. RR 30.





2120 pt remains dyspneic after xopenex #2 90% on 2 L RR 28





2140 Pt desat to 85% RA. pt requiring 4L O2 for oxygen to remain in the low 90s





215 pt remains dyspneic after xopenex #3 92% on 2 L RR 30





2200 Dr cheung accepted pt for transfer to [facility]. 





- EKG/XRAY/CT


EKG: rhythm (sinus tachycardia, regular, rate 119. no ectopy. )





ED Results Lab/Radiology





- Radiology


Radiology Impressions: 





Report Submission Date: 2019 8:10:56 PM CST


Patient       Study


Name:   MESHA INGRAM       Date:   2019 7:51:15 PM CST


MRN:   [id number]       Modality Type:   DX


Gender:   F       Description:   CHEST


:   47       Institution:   [facility]


Physician:   AGUS CERON


     Accession:    [id number]


 


 


Portable chest 





History:  Dyspnea and asthma 





Findings:  Bilateral lower lobe infiltrates or edema are observed.  Cardiomegaly

 and calcified granulomas are observed.  Small pleural effusions are not 

excluded.  Aeration has worsened at the lung bases since 2018.  Emphysema 

is probably present. 





Impression: 





1. Moderate bilateral lower lobe infiltrates or edema.   





2. Possibly small pleural effusions. 





3. Cardiomegaly. 





4. Probable emphysema.


 


Electronically signed on 2019 8:10:56 PM CST by:


Saw Pond





- Orders


Orders: 





                                    ED Orders











 Category Date Time Status


 


 Place IV Lock 1T Care  19 19:02 Ordered


 


 CHEST 1VIEW [RAD] Stat Exams  19 Ordered


 


 CBC/PLATELET/DIFF Stat Lab  19 19:02 Ordered


 


 CMP [CMP] Stat Lab  19 19:03 Ordered


 


 Levalbuterol HCl [Xopenex] Med  19 19:00 Stat





 1.25 mg NEB NOW STA   


 


 methylPREDNISolone SOD SUCC [Solu-MEDROL] Med  19 19:03 Stat





 125 mg IVP NOW STA   














Asthma Physical Exam





- EXAM


General Appearance: mild distress


EENT: eye inspection normal, ENT inspection normal, pharynx normal, no signs of 

dehydration, LLUVIA, TM's nml


Neck: nml inspection


Respiratory: no resp. distress, breath sounds nml, no pain on inspiration, 

wheezes, no pleuritic chest pain, other (speaks partial sentences. diminished. )


CVS: reg rate & rhythm, heart sounds normal, equal pulses, no murmur, no gallop,

PMI nml, no JVD, no friction rub, 24


Abdomen: non-tender, no organomegaly, nml bowel sounds, no distention


Skin: color nml, no rash, warm, nml palp., dry


Extremities: non-tender, normal range of motion, no evidence of injury, no 

edema, J, NP


Neuro/Psych: oriented x3





Discharge


Clincal Impression: 


Asthma exacerbation


Qualifiers:


 Asthma severity: moderate Asthma persistence: unspecified Qualified Code(s): 

J45.901 - Unspecified asthma with (acute) exacerbation





Pneumonia


Qualifiers:


 Pneumonia type: due to unspecified organism Laterality: unspecified laterality 

Lung location: unspecified part of lung Qualified Code(s): J18.9 - Pneumonia, 

unspecified organism





Condition: Fair


Disposition: 02 XFER [facility]M HOSP


Decision to Admit: NO


Date of Decison to Admit: 19


Decision Time: 21:11

## 2019-01-08 VITALS — DIASTOLIC BLOOD PRESSURE: 88 MMHG | SYSTOLIC BLOOD PRESSURE: 164 MMHG

## 2019-01-08 LAB
APPEARANCE UR: CLEAR
COLOR,URINE: YELLOW
PH UR STRIP: 6 [PH] (ref 5–8)
RBC UR QL: (no result)
UROBILINOGEN URINE: 0.2 EU (ref 0.2–1)

## 2019-01-08 NOTE — DIAGNOSTIC IMAGING REPORT
AGUS CERON 

[facility]

[address] [facility] [address] [address]

 

 

 

 

Report Submission Date: 2019 8:10:56 PM CST

Patient       Study

Name:   MESHA INGRAM       Date:   2019 7:51:15 PM CST

MRN:   [id number]       Modality Type:   DX

Gender:   F       Description:   CHEST

:   47       Institution:   [facility]

Physician:   AGUS CERON

     Accession:    [id number]

 

 

Portable chest 



History:  Dyspnea and asthma 



Findings:  Bilateral lower lobe infiltrates or edema are observed.  Cardiomegaly
and calcified granulomas are observed.  Small pleural effusions are not 
excluded.  Aeration has worsened at the lung bases since 2018.  Emphysema 
is probably present. 



Impression: 



1. Moderate bilateral lower lobe infiltrates or edema.   



2. Possibly small pleural effusions. 



3. Cardiomegaly. 



4. Probable emphysema.

 

Electronically signed on 2019 8:10:56 PM CST by:

Saw BROOKS